# Patient Record
Sex: MALE | Race: WHITE | NOT HISPANIC OR LATINO | URBAN - METROPOLITAN AREA
[De-identification: names, ages, dates, MRNs, and addresses within clinical notes are randomized per-mention and may not be internally consistent; named-entity substitution may affect disease eponyms.]

---

## 2021-01-11 ENCOUNTER — OFFICE VISIT (OUTPATIENT)
Dept: URBAN - METROPOLITAN AREA CLINIC 35 | Facility: CLINIC | Age: 51
End: 2021-01-11

## 2021-01-11 ENCOUNTER — TELEPHONE ENCOUNTER (OUTPATIENT)
Dept: URBAN - METROPOLITAN AREA SURGERY CENTER 8 | Facility: SURGERY CENTER | Age: 51
End: 2021-01-11

## 2021-01-11 VITALS
HEART RATE: 71 BPM | TEMPERATURE: 98.6 F | BODY MASS INDEX: 27.35 KG/M2 | SYSTOLIC BLOOD PRESSURE: 140 MMHG | HEIGHT: 75 IN | WEIGHT: 220 LBS | OXYGEN SATURATION: 99 % | DIASTOLIC BLOOD PRESSURE: 70 MMHG

## 2021-01-11 RX ORDER — SODIUM SULFATE, POTASSIUM SULFATE, MAGNESIUM SULFATE 17.5; 3.13; 1.6 G/ML; G/ML; G/ML
AS DIRECTED SOLUTION, CONCENTRATE ORAL
Qty: 1 | Refills: 0 | OUTPATIENT
Start: 2021-01-11

## 2021-01-11 RX ORDER — CARVEDILOL 12.5 MG/1
1 TABLET WITH FOOD TABLET, FILM COATED ORAL TWICE A DAY
Qty: 60 | Status: ON HOLD | COMMUNITY

## 2021-01-11 RX ORDER — ALBUTEROL SULFATE 90 UG/1
1 PUFF AS NEEDED AEROSOL, METERED RESPIRATORY (INHALATION)
Status: ACTIVE | COMMUNITY

## 2021-01-11 RX ORDER — LOSARTAN POTASSIUM AND HYDROCHLOROTHIAZIDE 100; 25 MG/1; MG/1
1 TABLET TABLET, FILM COATED ORAL ONCE A DAY
Status: ACTIVE | COMMUNITY

## 2021-01-11 RX ORDER — NITROGLYCERIN 0.4 MG/1
AS DIRECTED TABLET SUBLINGUAL
Status: ON HOLD | COMMUNITY

## 2021-01-11 RX ORDER — IVERMECTIN 10 MG/G
1 APPLICATION CREAM TOPICAL ONCE A DAY
Status: ACTIVE | COMMUNITY

## 2021-01-11 RX ORDER — DOXYCYCLINE 40 MG/1
1 CAPSULE IN THE MORNING ON AN EMPTY STOMACH CAPSULE ORAL ONCE A DAY
Qty: 30 | Status: ACTIVE | COMMUNITY

## 2021-01-11 NOTE — HPI-MIGRATED HPI
;     Colorectal Cancer Screening : Patient is a 50-year-old  male who presents today for a colorectal cancer screening. Patient's last colonoscopy performed in 2016 revealed hemorrhoids. Patient denies a family history of colon cancer/polyps and gastric/esophageal cancer/polyps. Patient currently admits 1 bowel movement a day. Stools are typically smooth. Patient denies melena, blood, or mucus in stool, heartburn, nausea, vomiting, diarrhea, or constipation. ;

## 2021-02-05 ENCOUNTER — OFFICE VISIT (OUTPATIENT)
Dept: URBAN - METROPOLITAN AREA SURGERY CENTER 8 | Facility: SURGERY CENTER | Age: 51
End: 2021-02-05

## 2021-02-05 ENCOUNTER — TELEPHONE ENCOUNTER (OUTPATIENT)
Dept: URBAN - METROPOLITAN AREA CLINIC 35 | Facility: CLINIC | Age: 51
End: 2021-02-05

## 2021-02-17 ENCOUNTER — TELEPHONE ENCOUNTER (OUTPATIENT)
Dept: URBAN - METROPOLITAN AREA CLINIC 35 | Facility: CLINIC | Age: 51
End: 2021-02-17

## 2021-02-19 ENCOUNTER — OFFICE VISIT (OUTPATIENT)
Dept: URBAN - METROPOLITAN AREA CLINIC 35 | Facility: CLINIC | Age: 51
End: 2021-02-19

## 2021-02-26 ENCOUNTER — TELEPHONE ENCOUNTER (OUTPATIENT)
Dept: URBAN - METROPOLITAN AREA CLINIC 35 | Facility: CLINIC | Age: 51
End: 2021-02-26

## 2021-03-02 ENCOUNTER — OFFICE VISIT (OUTPATIENT)
Dept: URBAN - METROPOLITAN AREA CLINIC 35 | Facility: CLINIC | Age: 51
End: 2021-03-02

## 2021-03-02 VITALS
BODY MASS INDEX: 27.35 KG/M2 | WEIGHT: 220 LBS | TEMPERATURE: 97.3 F | DIASTOLIC BLOOD PRESSURE: 88 MMHG | SYSTOLIC BLOOD PRESSURE: 142 MMHG | HEIGHT: 75 IN | HEART RATE: 97 BPM

## 2021-03-02 RX ORDER — SODIUM SULFATE, POTASSIUM SULFATE, MAGNESIUM SULFATE 17.5; 3.13; 1.6 G/ML; G/ML; G/ML
AS DIRECTED SOLUTION, CONCENTRATE ORAL
Qty: 1 | Refills: 0 | Status: DISCONTINUED | COMMUNITY
Start: 2021-01-11

## 2021-03-02 RX ORDER — ALBUTEROL SULFATE 90 UG/1
1 PUFF AS NEEDED AEROSOL, METERED RESPIRATORY (INHALATION)
Status: ACTIVE | COMMUNITY

## 2021-03-02 RX ORDER — CARVEDILOL 12.5 MG/1
1 TABLET WITH FOOD TABLET, FILM COATED ORAL TWICE A DAY
Qty: 60 | Status: ON HOLD | COMMUNITY

## 2021-03-02 RX ORDER — LOSARTAN POTASSIUM AND HYDROCHLOROTHIAZIDE 100; 25 MG/1; MG/1
1 TABLET TABLET, FILM COATED ORAL ONCE A DAY
Status: ACTIVE | COMMUNITY

## 2021-03-02 RX ORDER — NITROGLYCERIN 0.4 MG/1
AS DIRECTED TABLET SUBLINGUAL
Status: ON HOLD | COMMUNITY

## 2021-03-02 RX ORDER — IVERMECTIN 10 MG/G
1 APPLICATION CREAM TOPICAL ONCE A DAY
Status: ACTIVE | COMMUNITY

## 2021-03-02 RX ORDER — DOXYCYCLINE 40 MG/1
1 CAPSULE IN THE MORNING ON AN EMPTY STOMACH CAPSULE ORAL ONCE A DAY
Qty: 30 | Status: ACTIVE | COMMUNITY

## 2021-03-02 NOTE — HPI-MIGRATED HPI
Hemorrhoid Banding : Bowel and Dietary Habits -> Do you suffer from Constipation? No, Do you suffer from Diarrhea? No, Do you have to strain when having a bowel movement? No, Do you ever feel like you're "still not done" after a bowel movement? Yes, Time spent on the toilet during an average bowel movement? 15, Does any tissue ever poke out of your rectum during a bowel movement? Yes, If so, does the tissue go back by itself or do you have to push it back in?, Are you taking any fiber supplements? No, On average, do you drink the equivelent of 6-8 glasses of water per day? Yes;   Hemorrhoid Banding : Which symptoms apply? -> Pain No, Bleeding Yes, Itching No, Burning No, Leaking or Soiling Yes, Pressure No;   Hemorrhoid Banding : Additional Questions -> Are you allergic to Latex? No, Are you pregnant? No, Are you taking any erectile dysfunction medications? No, Are you taking any nitrates for chest pain? No, Are you taking any anticoagulation medication, such as Coumadin or Plavix? No, Have you ever been diagnosed with Crohn's disease, proctitis, portal hypertension, or anal/rectal cancer? No, Are you taking immunosuppressant medication or undergoing radiation treatments? No;   ;   ;     Hemorrhoid Banding : Patient presents today for a hemorrhoid banding consultation.  Admit years history of hemorrhoid flare ups.  Described as tissue protruding from rectum that occur 3-4 days a week.  He will push the tissue back in and will stay until have another bowel movement.  Currently admit 1 formed bowel movement per day. Denies melena, blood or mucus in stools.  Has been treated with a suppository years ago, but never used.;   Rectal Bleeding : Admit rare episodes of rectal bleeding.  Last occurred few months ago. Described as bright red blood that is present on tissue. ;   Colorectal Cancer Screening : Currently admit 1 normal bowel movement a day.  Denies melena, blood, or mucus in stools.  Last visit (1/11/2021) Patient is a 50-year-old  male who presents today for a colorectal cancer screening. Patient's last colonoscopy performed in 2016 revealed hemorrhoids. Patient denies a family history of colon cancer/polyps and gastric/esophageal cancer/polyps. Patient currently admits 1 bowel movement a day. Stools are typically smooth. Patient denies melena, blood, or mucus in stool, heartburn, nausea, vomiting, diarrhea, or constipation. ;

## 2021-04-02 ENCOUNTER — OFFICE VISIT (OUTPATIENT)
Dept: URBAN - METROPOLITAN AREA SURGERY CENTER 8 | Facility: SURGERY CENTER | Age: 51
End: 2021-04-02

## 2021-04-20 ENCOUNTER — DASHBOARD ENCOUNTERS (OUTPATIENT)
Age: 51
End: 2021-04-20

## 2021-04-20 ENCOUNTER — OFFICE VISIT (OUTPATIENT)
Dept: URBAN - METROPOLITAN AREA CLINIC 35 | Facility: CLINIC | Age: 51
End: 2021-04-20

## 2021-04-20 VITALS — HEIGHT: 75 IN | TEMPERATURE: 98.6 F | BODY MASS INDEX: 27.1 KG/M2 | WEIGHT: 218 LBS

## 2021-04-20 PROBLEM — 95546008: Status: ACTIVE | Noted: 2021-04-20

## 2021-04-20 PROBLEM — 266464001 HEMORRHAGE OF RECTUM AND ANUS: Status: ACTIVE | Noted: 2021-03-02

## 2021-04-20 PROBLEM — 91985000: Status: ACTIVE | Noted: 2021-04-05

## 2021-04-20 PROBLEM — 721705006: Status: ACTIVE | Noted: 2021-03-02

## 2021-04-20 RX ORDER — NITROGLYCERIN 0.4 MG/1
AS DIRECTED TABLET SUBLINGUAL
Status: ON HOLD | COMMUNITY

## 2021-04-20 RX ORDER — LOSARTAN POTASSIUM AND HYDROCHLOROTHIAZIDE 100; 25 MG/1; MG/1
1 TABLET TABLET, FILM COATED ORAL ONCE A DAY
Status: ACTIVE | COMMUNITY

## 2021-04-20 RX ORDER — ALBUTEROL SULFATE 90 UG/1
1 PUFF AS NEEDED AEROSOL, METERED RESPIRATORY (INHALATION)
Status: ACTIVE | COMMUNITY

## 2021-04-20 RX ORDER — DOXYCYCLINE 40 MG/1
1 CAPSULE IN THE MORNING ON AN EMPTY STOMACH CAPSULE ORAL ONCE A DAY
Qty: 30 | Status: DISCONTINUED | COMMUNITY

## 2021-04-20 RX ORDER — CARVEDILOL 12.5 MG/1
1 TABLET WITH FOOD TABLET, FILM COATED ORAL TWICE A DAY
Qty: 60 | Status: ON HOLD | COMMUNITY

## 2021-04-20 RX ORDER — IVERMECTIN 10 MG/G
1 APPLICATION CREAM TOPICAL ONCE A DAY
Status: ACTIVE | COMMUNITY

## 2021-04-20 NOTE — HPI-MIGRATED HPI
Hemorrhoid Banding : Bowel and Dietary Habits -> Do you suffer from Constipation? No, Do you suffer from Diarrhea? No, Do you have to strain when having a bowel movement? No, Do you ever feel like you're "still not done" after a bowel movement? Yes, Time spent on the toilet during an average bowel movement? 10, Does any tissue ever poke out of your rectum during a bowel movement? Yes, If so, does the tissue go back by itself or do you have to push it back in?, Are you taking any fiber supplements? No, On average, do you drink the equivelent of 6-8 glasses of water per day? Yes;   Hemorrhoid Banding : Which symptoms apply? -> Pain No, Bleeding No, Itching No, Burning No, Leaking or Soiling No, Pressure No;   Hemorrhoid Banding : Additional Questions -> Are you allergic to Latex? No, Are you pregnant? No, Are you taking any erectile dysfunction medications? No, Are you taking any nitrates for chest pain? No, Are you taking any anticoagulation medication, such as Coumadin or Plavix? No, Have you ever been diagnosed with Crohn's disease, proctitis, portal hypertension, or anal/rectal cancer? No, Are you taking immunosuppressant medication or undergoing radiation treatments? No;   ;   ;   ;     Flexible Sigmoidoscopy : 50-Year-old male who presents today for a follow up from his flex sigmoidoscopy with hemorrhoid banding.  He denies any complications after his procedure.    He currently reports 1-2 formed bowel movements per without strain.  Denies any mucus, melena or blood in his stools.  Denies any pruritus ani or rectal bleeding/pain. ;   Hemorrhoid Banding : Admit a pressure sensation rectally the day of procedure lasting throughout the night causing difficulty in sleeping.  Denies any rectal pain or pinching.  Admit 2 episodes of tissue protruding from rectum since procedure.  The tissue did go away or went back in on it's own.  Denies the use of medication for symptoms.   Last visit (03/02/2021) Patient presents today for a hemorrhoid banding consultation.  Admit years history of hemorrhoid flare ups.  Described as tissue protruding from rectum that occur 3-4 days a week.  He will push the tissue back in and will stay until have another bowel movement.  Currently admit 1 formed bowel movement per day. Denies melena, blood or mucus in stools.  Has been treated with a suppository years ago, but never used.;   Rectal Bleeding : Denies any rectal bleeding since his last office visit.   Last visit (03/02/2021) Admit rare episodes of rectal bleeding.  Last occurred few months ago. Described as bright red blood that is present on tissue.;   Colorectal Cancer Screening : Patient has a 5 year colonoscopy recall alert placed.     Last visit (03/02/2021) Currently admit 1 normal bowel movement a day.  Denies melena, blood, or mucus in stools.  Last visit (1/11/2021) Patient is a 50-year-old  male who presents today for a colorectal cancer screening. Patient's last colonoscopy performed in 2016 revealed hemorrhoids. Patient denies a family history of colon cancer/polyps and gastric/esophageal cancer/polyps. Patient currently admits 1 bowel movement a day. Stools are typically smooth. Patient denies melena, blood, or mucus in stool, heartburn, nausea, vomiting, diarrhea, or constipation.;